# Patient Record
Sex: MALE | Race: OTHER | Employment: UNEMPLOYED | URBAN - METROPOLITAN AREA
[De-identification: names, ages, dates, MRNs, and addresses within clinical notes are randomized per-mention and may not be internally consistent; named-entity substitution may affect disease eponyms.]

---

## 2019-12-07 ENCOUNTER — HOSPITAL ENCOUNTER (EMERGENCY)
Facility: HOSPITAL | Age: 34
Discharge: HOME | End: 2019-12-07

## 2019-12-07 ENCOUNTER — APPOINTMENT (OUTPATIENT)
Dept: GENERAL RADIOLOGY | Facility: HOSPITAL | Age: 34
End: 2019-12-07

## 2019-12-07 VITALS
BODY MASS INDEX: 31.15 KG/M2 | SYSTOLIC BLOOD PRESSURE: 142 MMHG | TEMPERATURE: 99 F | WEIGHT: 230 LBS | OXYGEN SATURATION: 98 % | HEART RATE: 60 BPM | DIASTOLIC BLOOD PRESSURE: 94 MMHG | RESPIRATION RATE: 16 BRPM | HEIGHT: 72 IN

## 2019-12-07 DIAGNOSIS — S52.125A CLOSED NONDISPLACED FRACTURE OF HEAD OF LEFT RADIUS, INITIAL ENCOUNTER: Primary | ICD-10-CM

## 2019-12-07 DIAGNOSIS — S62.112A CLOSED DISPLACED FRACTURE OF TRIQUETRUM OF LEFT WRIST, INITIAL ENCOUNTER: ICD-10-CM

## 2019-12-07 PROCEDURE — 73130 X-RAY EXAM OF HAND: CPT

## 2019-12-07 PROCEDURE — 99284 EMERGENCY DEPT VISIT MOD MDM: CPT

## 2019-12-07 PROCEDURE — 73080 X-RAY EXAM OF ELBOW: CPT

## 2019-12-07 PROCEDURE — 73110 X-RAY EXAM OF WRIST: CPT

## 2019-12-07 RX ORDER — HYDROCODONE BITARTRATE AND ACETAMINOPHEN 5; 325 MG/1; MG/1
1-2 TABLET ORAL EVERY 6 HOURS PRN
Qty: 10 TABLET | Refills: 0 | Status: SHIPPED | OUTPATIENT
Start: 2019-12-07 | End: 2019-12-14

## 2019-12-07 RX ORDER — HYDROCODONE BITARTRATE AND ACETAMINOPHEN 5; 325 MG/1; MG/1
1 TABLET ORAL ONCE
Status: COMPLETED | OUTPATIENT
Start: 2019-12-07 | End: 2019-12-07

## 2019-12-07 NOTE — CM/SW NOTE
Cm met with patient and sister at bedside    Sister states patient was living in [place] but now stays with her in [place] given for   [facility] (they also can help find an orthopedic, pt self pay)  Access to Care resources also given

## 2019-12-07 NOTE — ED INITIAL ASSESSMENT (HPI)
Fell approx 10ft from ladder while hanging [place] lights approx 1-2 hours ago. Fell on outstretched L arm. Pain to L elbow and wrist. Hand is cool to touch, weak radial pulse. Brisk capillary refill. Denies head neck or back pain. No LOC reported.

## 2019-12-07 NOTE — ED NOTES
DISCHARGE INSTRUCTIONS GIVEN TO PATIENT. VERBALIZED UNDERSTANDING. PATIENT IN NO DISTRESS.   PATIENT LEFT ED AMBULATORY STEADY GAIT

## 2019-12-08 NOTE — ED PROVIDER NOTES
Patient Seen in: [facility] Emergency Department      History   Patient presents with:  Fall    Stated Complaint: Fall 10 feet off ladder    HPI    70-year-old male presents for evaluation after a fall.   Patient reports he fell approximately 10 fe sounds. Abdominal:      General: Bowel sounds are normal.      Palpations: Abdomen is soft. Tenderness: There is no tenderness. Musculoskeletal: Normal range of motion.       Comments: No clavicular tenderness bilat, TTP and pain with ROM to LUE, o Heber West MD on 12/07/2019 at 13:39               XR HAND (MIN 3 VIEWS), LEFT (CPT=73130)   Final Result    PROCEDURE: XR HAND (MIN 3 VIEWS), LEFT (CPT=73130)         COMPARISON: None.          INDICATIONS: Left hand pain and swelling post fal possible for a visit in 3 days  For follow up        Medications Prescribed:  Discharge Medication List as of 12/7/2019  4:49 PM    START taking these medications    HYDROcodone-acetaminophen 5-325 MG Oral Tab  Take 1-2 tablets by mouth every 6 (six) hours
